# Patient Record
Sex: MALE | Race: ASIAN | NOT HISPANIC OR LATINO
[De-identification: names, ages, dates, MRNs, and addresses within clinical notes are randomized per-mention and may not be internally consistent; named-entity substitution may affect disease eponyms.]

---

## 2017-09-22 ENCOUNTER — APPOINTMENT (OUTPATIENT)
Dept: UROLOGY | Facility: CLINIC | Age: 72
End: 2017-09-22
Payer: MEDICARE

## 2017-09-22 VITALS
HEART RATE: 89 BPM | TEMPERATURE: 98 F | OXYGEN SATURATION: 97 % | BODY MASS INDEX: 22.85 KG/M2 | WEIGHT: 117 LBS | SYSTOLIC BLOOD PRESSURE: 165 MMHG | RESPIRATION RATE: 17 BRPM | DIASTOLIC BLOOD PRESSURE: 87 MMHG

## 2017-09-22 PROCEDURE — 99214 OFFICE O/P EST MOD 30 MIN: CPT

## 2017-09-29 ENCOUNTER — APPOINTMENT (OUTPATIENT)
Age: 72
End: 2017-09-29
Payer: MEDICARE

## 2017-09-29 VITALS
BODY MASS INDEX: 22.85 KG/M2 | SYSTOLIC BLOOD PRESSURE: 153 MMHG | TEMPERATURE: 98 F | HEART RATE: 79 BPM | RESPIRATION RATE: 18 BRPM | DIASTOLIC BLOOD PRESSURE: 78 MMHG | WEIGHT: 117 LBS | OXYGEN SATURATION: 98 %

## 2017-09-29 PROCEDURE — 99215 OFFICE O/P EST HI 40 MIN: CPT

## 2017-09-29 RX ORDER — BACITRACIN ZINC, NEOMYCIN SULFATE, AND POLYMYXIN B SULFATE 400; 3.5; 5 [IU]/G; MG/G; [IU]/G
3.5-400-5 OINTMENT TOPICAL TWICE DAILY
Qty: 1 | Refills: 0 | Status: ACTIVE | COMMUNITY
Start: 2017-09-29 | End: 1900-01-01

## 2017-10-04 LAB
APPEARANCE: CLEAR
BACTERIA UR CULT: ABNORMAL
BACTERIA: ABNORMAL
BILIRUBIN URINE: NEGATIVE
BLOOD URINE: NEGATIVE
COLOR: YELLOW
GLUCOSE QUALITATIVE U: NORMAL MG/DL
KETONES URINE: NEGATIVE
LEUKOCYTE ESTERASE URINE: NEGATIVE
MICROSCOPIC-UA: NORMAL
NITRITE URINE: POSITIVE
PH URINE: 6.5
PROTEIN URINE: NEGATIVE MG/DL
RED BLOOD CELLS URINE: 0 /HPF
SPECIFIC GRAVITY URINE: 1.01
SQUAMOUS EPITHELIAL CELLS: 0 /HPF
UROBILINOGEN URINE: NORMAL MG/DL
WHITE BLOOD CELLS URINE: 4 /HPF

## 2017-10-31 ENCOUNTER — APPOINTMENT (OUTPATIENT)
Dept: UROLOGY | Facility: CLINIC | Age: 72
End: 2017-10-31
Payer: MEDICARE

## 2017-10-31 VITALS
RESPIRATION RATE: 18 BRPM | DIASTOLIC BLOOD PRESSURE: 80 MMHG | TEMPERATURE: 97.9 F | WEIGHT: 118 LBS | OXYGEN SATURATION: 99 % | BODY MASS INDEX: 23.05 KG/M2 | SYSTOLIC BLOOD PRESSURE: 147 MMHG | HEART RATE: 88 BPM

## 2017-10-31 PROCEDURE — 99213 OFFICE O/P EST LOW 20 MIN: CPT

## 2017-11-02 LAB
APPEARANCE: CLEAR
BACTERIA UR CULT: NORMAL
BACTERIA: NEGATIVE
BILIRUBIN URINE: NEGATIVE
BLOOD URINE: NEGATIVE
CALCIUM OXALATE CRYSTALS: ABNORMAL
COLOR: YELLOW
GLUCOSE QUALITATIVE U: NEGATIVE MG/DL
HYALINE CASTS: 0 /LPF
KETONES URINE: NEGATIVE
LEUKOCYTE ESTERASE URINE: NEGATIVE
MICROSCOPIC-UA: NORMAL
NITRITE URINE: NEGATIVE
PH URINE: 6
PROTEIN URINE: NEGATIVE MG/DL
RED BLOOD CELLS URINE: 0 /HPF
SPECIFIC GRAVITY URINE: 1.02
SQUAMOUS EPITHELIAL CELLS: 0 /HPF
UROBILINOGEN URINE: NEGATIVE MG/DL
WHITE BLOOD CELLS URINE: 0 /HPF

## 2018-05-04 ENCOUNTER — APPOINTMENT (OUTPATIENT)
Dept: UROLOGY | Facility: CLINIC | Age: 73
End: 2018-05-04
Payer: MEDICARE

## 2018-05-04 VITALS
DIASTOLIC BLOOD PRESSURE: 76 MMHG | OXYGEN SATURATION: 96 % | SYSTOLIC BLOOD PRESSURE: 147 MMHG | BODY MASS INDEX: 23.24 KG/M2 | WEIGHT: 119 LBS | TEMPERATURE: 98 F | RESPIRATION RATE: 17 BRPM | HEART RATE: 84 BPM

## 2018-05-04 PROCEDURE — 99214 OFFICE O/P EST MOD 30 MIN: CPT

## 2018-05-08 LAB
APPEARANCE: CLEAR
BACTERIA UR CULT: NORMAL
BACTERIA: NEGATIVE
BILIRUBIN URINE: NEGATIVE
BLOOD URINE: NEGATIVE
COLOR: YELLOW
GLUCOSE QUALITATIVE U: NEGATIVE MG/DL
HYALINE CASTS: 1 /LPF
KETONES URINE: NEGATIVE
LEUKOCYTE ESTERASE URINE: NEGATIVE
MICROSCOPIC-UA: NORMAL
NITRITE URINE: NEGATIVE
PH URINE: 6.5
PROTEIN URINE: ABNORMAL MG/DL
RED BLOOD CELLS URINE: 1 /HPF
SPECIFIC GRAVITY URINE: 1.02
SQUAMOUS EPITHELIAL CELLS: 1 /HPF
UROBILINOGEN URINE: NEGATIVE MG/DL
WHITE BLOOD CELLS URINE: 1 /HPF

## 2018-09-28 ENCOUNTER — APPOINTMENT (OUTPATIENT)
Dept: UROLOGY | Facility: CLINIC | Age: 73
End: 2018-09-28
Payer: MEDICARE

## 2018-09-28 VITALS
RESPIRATION RATE: 17 BRPM | BODY MASS INDEX: 22.85 KG/M2 | HEART RATE: 71 BPM | SYSTOLIC BLOOD PRESSURE: 148 MMHG | OXYGEN SATURATION: 98 % | TEMPERATURE: 97.9 F | DIASTOLIC BLOOD PRESSURE: 76 MMHG | WEIGHT: 117 LBS

## 2018-09-28 PROCEDURE — 99214 OFFICE O/P EST MOD 30 MIN: CPT

## 2018-09-29 LAB
APPEARANCE: CLEAR
BACTERIA: NEGATIVE
BILIRUBIN URINE: NEGATIVE
BLOOD URINE: NEGATIVE
COLOR: YELLOW
GLUCOSE QUALITATIVE U: NEGATIVE MG/DL
KETONES URINE: NEGATIVE
LEUKOCYTE ESTERASE URINE: NEGATIVE
MICROSCOPIC-UA: NORMAL
NITRITE URINE: NEGATIVE
PH URINE: 6.5
PROTEIN URINE: NEGATIVE MG/DL
RED BLOOD CELLS URINE: 1 /HPF
SPECIFIC GRAVITY URINE: 1.01
SQUAMOUS EPITHELIAL CELLS: 0 /HPF
UROBILINOGEN URINE: NEGATIVE MG/DL
WHITE BLOOD CELLS URINE: 0 /HPF

## 2018-11-13 ENCOUNTER — APPOINTMENT (OUTPATIENT)
Dept: UROLOGY | Facility: CLINIC | Age: 73
End: 2018-11-13
Payer: MEDICARE

## 2018-11-13 VITALS
HEART RATE: 93 BPM | OXYGEN SATURATION: 96 % | DIASTOLIC BLOOD PRESSURE: 79 MMHG | TEMPERATURE: 97.6 F | BODY MASS INDEX: 23.83 KG/M2 | SYSTOLIC BLOOD PRESSURE: 155 MMHG | RESPIRATION RATE: 18 BRPM | WEIGHT: 122 LBS

## 2018-11-13 DIAGNOSIS — N13.8 BENIGN PROSTATIC HYPERPLASIA WITH LOWER URINARY TRACT SYMPMS: ICD-10-CM

## 2018-11-13 DIAGNOSIS — S81.802A UNSPECIFIED OPEN WOUND, LEFT LOWER LEG, INITIAL ENCOUNTER: ICD-10-CM

## 2018-11-13 DIAGNOSIS — N40.1 BENIGN PROSTATIC HYPERPLASIA WITH LOWER URINARY TRACT SYMPMS: ICD-10-CM

## 2018-11-13 PROCEDURE — 99214 OFFICE O/P EST MOD 30 MIN: CPT | Mod: 25

## 2018-11-13 PROCEDURE — 76775 US EXAM ABDO BACK WALL LIM: CPT

## 2018-11-19 LAB
APPEARANCE: CLEAR
BACTERIA UR CULT: NORMAL
BACTERIA: NEGATIVE
BILIRUBIN URINE: NEGATIVE
BLOOD URINE: ABNORMAL
COLOR: YELLOW
GLUCOSE QUALITATIVE U: NEGATIVE MG/DL
HYALINE CASTS: 0 /LPF
KETONES URINE: NEGATIVE
LEUKOCYTE ESTERASE URINE: NEGATIVE
MICROSCOPIC-UA: NORMAL
NITRITE URINE: NEGATIVE
PH URINE: 6
PROTEIN URINE: NEGATIVE MG/DL
RED BLOOD CELLS URINE: 7 /HPF
SPECIFIC GRAVITY URINE: 1.01
SQUAMOUS EPITHELIAL CELLS: 0 /HPF
UROBILINOGEN URINE: NEGATIVE MG/DL
WHITE BLOOD CELLS URINE: 1 /HPF

## 2018-12-11 ENCOUNTER — APPOINTMENT (OUTPATIENT)
Dept: UROLOGY | Facility: CLINIC | Age: 73
End: 2018-12-11
Payer: MEDICARE

## 2018-12-11 VITALS
HEART RATE: 95 BPM | SYSTOLIC BLOOD PRESSURE: 139 MMHG | RESPIRATION RATE: 18 BRPM | TEMPERATURE: 97.8 F | BODY MASS INDEX: 23.83 KG/M2 | OXYGEN SATURATION: 95 % | DIASTOLIC BLOOD PRESSURE: 67 MMHG | WEIGHT: 122 LBS

## 2018-12-11 LAB
APPEARANCE: CLEAR
BACTERIA: NEGATIVE
BILIRUBIN URINE: NEGATIVE
BLOOD URINE: NEGATIVE
COLOR: YELLOW
GLUCOSE QUALITATIVE U: NEGATIVE MG/DL
KETONES URINE: NEGATIVE
LEUKOCYTE ESTERASE URINE: NEGATIVE
MICROSCOPIC-UA: NORMAL
NITRITE URINE: NEGATIVE
PH URINE: 6.5
PROTEIN URINE: NEGATIVE MG/DL
RED BLOOD CELLS URINE: 0 /HPF
SPECIFIC GRAVITY URINE: 1.01
SQUAMOUS EPITHELIAL CELLS: 0 /HPF
UROBILINOGEN URINE: NEGATIVE MG/DL
WHITE BLOOD CELLS URINE: 0 /HPF

## 2018-12-11 PROCEDURE — 99213 OFFICE O/P EST LOW 20 MIN: CPT

## 2018-12-13 LAB — BACTERIA UR CULT: NORMAL

## 2019-03-12 ENCOUNTER — APPOINTMENT (OUTPATIENT)
Dept: UROLOGY | Facility: CLINIC | Age: 74
End: 2019-03-12
Payer: MEDICARE

## 2019-03-12 PROCEDURE — 99214 OFFICE O/P EST MOD 30 MIN: CPT | Mod: 25

## 2019-03-12 PROCEDURE — 76775 US EXAM ABDO BACK WALL LIM: CPT | Mod: 26

## 2019-03-12 NOTE — LETTER BODY
[FreeTextEntry1] : Musa Taveras MD\par [address] [facility]\[place] [address]\par [phone]\par [phone]\par \par Dear Dr. Taveras,\par \par Reason for visit: BPH. Left ureteral stone.\par \par This is a 73 year-old gentleman with chronic BPH and bacterial UTI. Patient returns today for renal ultrasound. Since his last visit, patient is doing well. He was previously emitted to [facility] for hematuria 1 month ago. Patient denies any flank pain. His recent renal ultrasound shows negative for hydronephrosis. The patient previously went to local ER for flank pain. CT scan demonstrating left UVJ stone. He was given Flomax. He denies any fevers of chills. He reports mild discomfort. He continues to take Proscar and Flomax twice a day regularly without any side effects or difficulties with the medication. Patient reports stable urinary symptoms with medication. Patient denies any urinary retention, dysuria, or changes in health. Patient has no pain. The past medical history and family history and social history are unchanged. All other review of systems are negative. Patient denies any changes in medications. Medication list was reconciled.\par \par On examination, the patient is an older-appearing gentleman in no acute distress. He is alert and oriented follows commands. He has normal mood and affect. He is normocephalic. Oral no thrush. Neck is supple. Respirations are unlabored. His abdomen is soft and nontender. Liver is nonpalpable. Bladder is nonpalpable. No CVA tenderness. Neurologically he is grossly intact. No peripheral edema. Skin without gross abnormality.\par \par I personally reviewed renal ultrasound with the patient today. Images demonstrated no evidence of stones, solid masses or hydronephrosis. \par \par Assessment: BPH, symptoms stable with Proscar and Flomax BID. Bacterial UTI. Left ureteral stone. \par \par I counseled the patient. In terms of his BPH, I encouraged patient to continue Flomax BID and Proscar. I renewed his prescription for his medication today. In terms of his left ureteral stone, ultrasound today was unremarkable. Patient is asymptomatic. The patient will BMP today. Risks and alternatives were discussed. I answered the patient questions. The patient will follow-up as directed and will contact me with any questions or concerns. Thank you for the opportunity to participate in the care of Mr. GUERRA. I will keep you updated on his progress.\par \par Plan: Continue Proscar and Flomax BID. BMP. Follow up in 3 months. \par

## 2019-03-12 NOTE — ADDENDUM
[FreeTextEntry1] : Entered by Kiah Art, acting as scribe for Dr. Tony Villalta.\par \par The documentation recorded by the scribe accurately reflects the service I personally performed and the decisions made by me.

## 2019-03-14 LAB
ANION GAP SERPL CALC-SCNC: 12 MMOL/L
BUN SERPL-MCNC: 15 MG/DL
CALCIUM SERPL-MCNC: 9 MG/DL
CHLORIDE SERPL-SCNC: 101 MMOL/L
CO2 SERPL-SCNC: 27 MMOL/L
CREAT SERPL-MCNC: 0.89 MG/DL
GLUCOSE SERPL-MCNC: 91 MG/DL
POTASSIUM SERPL-SCNC: 3.9 MMOL/L
SODIUM SERPL-SCNC: 140 MMOL/L

## 2019-05-10 ENCOUNTER — APPOINTMENT (OUTPATIENT)
Dept: UROLOGY | Facility: CLINIC | Age: 74
End: 2019-05-10

## 2019-06-18 ENCOUNTER — APPOINTMENT (OUTPATIENT)
Dept: UROLOGY | Facility: CLINIC | Age: 74
End: 2019-06-18
Payer: MEDICARE

## 2019-06-18 PROCEDURE — 99214 OFFICE O/P EST MOD 30 MIN: CPT | Mod: 25

## 2019-06-18 PROCEDURE — 76775 US EXAM ABDO BACK WALL LIM: CPT

## 2019-09-27 ENCOUNTER — APPOINTMENT (OUTPATIENT)
Dept: UROLOGY | Facility: CLINIC | Age: 74
End: 2019-09-27

## 2019-12-17 ENCOUNTER — APPOINTMENT (OUTPATIENT)
Dept: UROLOGY | Facility: CLINIC | Age: 74
End: 2019-12-17
Payer: MEDICARE

## 2019-12-17 PROCEDURE — 76775 US EXAM ABDO BACK WALL LIM: CPT

## 2019-12-17 PROCEDURE — 99214 OFFICE O/P EST MOD 30 MIN: CPT | Mod: 25

## 2019-12-17 NOTE — ADDENDUM
[FreeTextEntry1] : Entered by Jens Jimenez, acting as scribe for Dr. Tony Villalta.\par \par The documentation recorded by the scribe accurately reflects the service I personally performed and the decisions made by me.

## 2019-12-17 NOTE — LETTER BODY
[FreeTextEntry1] : Musa Taveras MD\par [address] [facility]\[place] [address]\par [phone] [phone]\par \par Dear Dr. Taveras,\par \par Reason for visit: BPH. Left ureteral stone.\par \par This is a 74 year-old Cantonese-speaking gentleman with chronic BPH and bacterial UTI. He was previously emitted to [facility] for hematuria in May 2019. The patient previously went to local ER for flank pain and obtained a CT scan demonstrating left UVJ stone. He was given Flomax. Patient returns today for renal ultrasound. Since his last visit, patient denies any flank pain. He denies passing any stones. He denies any fevers of chills. He continues to take Proscar and Flomax twice a day regularly without any side effects or difficulties with the medication. Patient reports stable urinary symptoms with medication. Patient denies any urinary retention, dysuria, or changes in health. Patient has no pain currently. The past medical history and family history and social history are unchanged. All other review of systems are negative. Patient denies any changes in medications. Medication list was reconciled.\par \par On examination, the patient is an older-appearing gentleman in no acute distress. He is alert and oriented follows commands. He has normal mood and affect. He is normocephalic. Oral no thrush. Neck is supple. Respirations are unlabored. His abdomen is soft and nontender. Liver is nonpalpable. Bladder is nonpalpable. No CVA tenderness. Neurologically he is grossly intact. No peripheral edema. Skin without gross abnormality.\par \par I personally reviewed renal ultrasound with the patient today. Images demonstrated no evidence of stones, solid masses or hydronephrosis. \par \par Assessment: BPH, symptoms stable with Proscar and Flomax BID. Bacterial UTI. Left ureteral stone, resolved.\par \par I counseled the patient. In terms of his BPH, his symptoms are stable with medication. I renewed the patient's prescription for Flomax BID and Proscar today. I encouraged the patient to continue medications regularly as directed. His renal ultrasound today demonstrated no evidence of stones or hydronephrosis. However, he denies passing any stones. I recommend the patient obtain CT abdomen and pelvis to confirm the results of his renal ultrasound. Risks and alternatives were discussed. I answered the patient questions. The patient will follow-up as directed and will contact me with any questions or concerns. Thank you for the opportunity to participate in the care of Mr. GUERRA. I will keep you updated on his progress.\par \par Plan: Continue Proscar and Flomax BID. CT abdomen and pelvis. Follow up as directed.

## 2020-01-21 ENCOUNTER — APPOINTMENT (OUTPATIENT)
Dept: UROLOGY | Facility: CLINIC | Age: 75
End: 2020-01-21
Payer: MEDICARE

## 2020-01-21 VITALS
RESPIRATION RATE: 18 BRPM | TEMPERATURE: 98.1 F | HEART RATE: 93 BPM | OXYGEN SATURATION: 96 % | DIASTOLIC BLOOD PRESSURE: 79 MMHG | WEIGHT: 124 LBS | BODY MASS INDEX: 24.22 KG/M2 | SYSTOLIC BLOOD PRESSURE: 162 MMHG

## 2020-01-21 PROCEDURE — 99214 OFFICE O/P EST MOD 30 MIN: CPT

## 2020-01-21 NOTE — LETTER BODY
[FreeTextEntry1] : Musa Taveras MD\par [address] [facility]\[place] [address]\par [phone] [phone]\par \par Dear Dr. Taveras,\par \par Reason for visit: BPH. Left ureteral stone.\par \par This is a 74 year-old Cantonese-speaking gentleman with chronic BPH and left ureteral stone. He was previously emitted to [facility] for hematuria in May 2019. The patient previously went to local ER for flank pain and obtained a CT scan demonstrating left UVJ stone. Patient returns today for follow-up. Since his last visit, patient obtained CT scan in Jan 2020 demonstrating enlargement in his left ureteral stones, now measuring up to 0.8 cm. He denies any flank pain or dysuria. He denies any interval changes in health. He continues to take Flomax and Proscar as directed. Patient has no pain currently. The past medical history and family history and social history are unchanged. All other review of systems are negative. Patient denies any changes in medications. Medication list was reconciled.\par \par On examination, the patient is an older-appearing gentleman in no acute distress. He is alert and oriented follows commands. He has normal mood and affect. He is normocephalic. Oral no thrush. Neck is supple. Respirations are unlabored. His abdomen is soft and nontender. Liver is nonpalpable. Bladder is nonpalpable. No CVA tenderness. Neurologically he is grossly intact. No peripheral edema. Skin without gross abnormality.\par \par I personally reviewed CT images with the patient today. Images demonstrated left ureteral stones measuring up to 0.8 cm, enlarging in the past year.\par \par Assessment: BPH, symptoms stable with Proscar and Flomax BID. Bacterial UTI, resolved. Left ureteral stone.\par \par I counseled the patient. Patient's CT scan demonstrates enlargement in his left ureteral stone, now measuring 0.8 cm. Patient understands that his ureteral stone has enlarged within the past year. Given the size and location of the stone, he is unlikely to pass the stone. The risk for chronic renal obstruction was discussed. I advised the patient to consider left ureteroscopy, laser lithotripsy, stone extraction, and stent placement. I counseled the patient on the procedure. Risks and alternatives were discussed. I answered the patient questions. Patient would like some time to consider his options. The patient will follow-up as directed and will contact me with any questions or concerns. Thank you for the opportunity to participate in the care of Mr. GUERRA. I will keep you updated on his progress.\par \par Plan: Consider left ureteroscopy, laser lithotripsy, stone extraction, and stent placement. Follow up as directed.\par

## 2020-01-21 NOTE — ADDENDUM
[FreeTextEntry1] : Entered by Godfrey Vargas, acting as scribe for Dr. Tony Villalta.\par \par The documentation recorded by the scribe accurately reflects the service I personally performed and the decisions made by me.

## 2020-03-10 ENCOUNTER — APPOINTMENT (OUTPATIENT)
Dept: UROLOGY | Facility: CLINIC | Age: 75
End: 2020-03-10
Payer: MEDICARE

## 2020-03-10 VITALS
TEMPERATURE: 97.9 F | BODY MASS INDEX: 24.41 KG/M2 | WEIGHT: 125 LBS | OXYGEN SATURATION: 98 % | DIASTOLIC BLOOD PRESSURE: 78 MMHG | SYSTOLIC BLOOD PRESSURE: 159 MMHG | RESPIRATION RATE: 18 BRPM | HEART RATE: 106 BPM

## 2020-03-10 PROCEDURE — 76775 US EXAM ABDO BACK WALL LIM: CPT

## 2020-03-10 PROCEDURE — 99213 OFFICE O/P EST LOW 20 MIN: CPT | Mod: 25

## 2020-03-10 PROCEDURE — 51798 US URINE CAPACITY MEASURE: CPT

## 2020-03-10 RX ORDER — CIPROFLOXACIN HYDROCHLORIDE 500 MG/1
500 TABLET, FILM COATED ORAL
Refills: 0 | Status: COMPLETED | OUTPATIENT
Start: 2020-03-10

## 2020-03-10 RX ADMIN — CIPROFLOXACIN HYDROCHLORIDE 0 MG: 500 TABLET, FILM COATED ORAL at 00:00

## 2020-03-11 LAB
ANION GAP SERPL CALC-SCNC: 15 MMOL/L
APTT BLD: 31.7 SEC
BASOPHILS # BLD AUTO: 0.04 K/UL
BASOPHILS NFR BLD AUTO: 0.4 %
BUN SERPL-MCNC: 12 MG/DL
CALCIUM SERPL-MCNC: 9.3 MG/DL
CHLORIDE SERPL-SCNC: 104 MMOL/L
CO2 SERPL-SCNC: 23 MMOL/L
CREAT SERPL-MCNC: 0.86 MG/DL
EOSINOPHIL # BLD AUTO: 0.11 K/UL
EOSINOPHIL NFR BLD AUTO: 1.2 %
GLUCOSE SERPL-MCNC: 100 MG/DL
HCT VFR BLD CALC: 44.7 %
HGB BLD-MCNC: 13.2 G/DL
IMM GRANULOCYTES NFR BLD AUTO: 0.2 %
INR PPP: 1.09 RATIO
LYMPHOCYTES # BLD AUTO: 1.93 K/UL
LYMPHOCYTES NFR BLD AUTO: 20.4 %
MAN DIFF?: NORMAL
MCHC RBC-ENTMCNC: 21.2 PG
MCHC RBC-ENTMCNC: 29.5 GM/DL
MCV RBC AUTO: 71.9 FL
MONOCYTES # BLD AUTO: 0.72 K/UL
MONOCYTES NFR BLD AUTO: 7.6 %
NEUTROPHILS # BLD AUTO: 6.66 K/UL
NEUTROPHILS NFR BLD AUTO: 70.2 %
PLATELET # BLD AUTO: 232 K/UL
POTASSIUM SERPL-SCNC: 4.1 MMOL/L
PT BLD: 12.6 SEC
RBC # BLD: 6.22 M/UL
RBC # FLD: 17.9 %
SODIUM SERPL-SCNC: 141 MMOL/L
WBC # FLD AUTO: 9.48 K/UL

## 2020-03-12 NOTE — LETTER BODY
[FreeTextEntry1] : Musa Taveras MD\par [address] [facility]\[place] [address]\par [phone]\par [phone]\par \par Dear Dr. Taveras,\par \par Reason for visit: BPH. Left ureteral stone.\par \par This is a 74 year-old Cantonese-speaking gentleman with chronic BPH and left ureteral stone. The patient previously went to local ER for flank pain and obtained a CT scan demonstrating left UVJ stone. He obtained a CT scan in Jan 2020 demonstrating enlargement in his ureteral stones measuring 0.8 cm. Patient returns today for voiding trial. Since his last visit, he denies any interval changes in health. He continues to take Flomax and Proscar as directed. All other review of systems were negative. The past medical history and family history and social history are unchanged. All other review of systems are negative. Patient denies any changes in medications. Medication list was reconciled.\par \par On examination, the patient is an older-appearing gentleman in no acute distress. He is alert and oriented follows commands. He has normal mood and affect. He is normocephalic. Oral no thrush. Neck is supple. Respirations are unlabored. His abdomen is soft and nontender. Liver is nonpalpable. Bladder is nonpalpable. No CVA tenderness. Neurologically he is grossly intact. No peripheral edema. Skin without gross abnormality.\par \par I personally reviewed ultrasound images with the patient today. Images demonstrated a 1.1 cm nonvascular cyst with a thin septation visualized in the right lower pole.  Both kidneys appear normal in size and echogenicity. No stones, solid masses or hydronephrosis visualized.\par \par Patient was given a voiding trial today. Patient was able to void spontaneously.\par \par Post-void residual on bladder scan today was 147 cc.\par \par Assessment: BPH, symptoms stable with Proscar and Flomax BID. Bacterial UTI, resolved. Left ureteral stone s/p ureteroscopy.\par \par I counseled the patient. Patient's ultrasound today indicates resolution of his left ureteral stone. He passed his voiding trial today. Patient was taught to CIC with 16 straight Ukrainian García catheter. I counseled the patient regarding the procedure. I recommend he follow up on Friday for re-evaluation. The risks and benefits were discussed. Alternatives were given. I answered the patient questions. The patient will follow-up as directed and will contact me with any questions or concerns. Thank you for the opportunity to participate in the care of Mr. GUERRA. I will keep you updated on his progress.\par \par Plan: CIC. Follow up on Friday.

## 2020-03-13 ENCOUNTER — APPOINTMENT (OUTPATIENT)
Dept: UROLOGY | Facility: CLINIC | Age: 75
End: 2020-03-13
Payer: MEDICARE

## 2020-03-13 VITALS
HEART RATE: 96 BPM | WEIGHT: 125 LBS | OXYGEN SATURATION: 96 % | DIASTOLIC BLOOD PRESSURE: 80 MMHG | RESPIRATION RATE: 18 BRPM | BODY MASS INDEX: 24.41 KG/M2 | SYSTOLIC BLOOD PRESSURE: 155 MMHG | TEMPERATURE: 98.2 F

## 2020-03-13 PROCEDURE — 51798 US URINE CAPACITY MEASURE: CPT

## 2020-03-13 PROCEDURE — 99214 OFFICE O/P EST MOD 30 MIN: CPT | Mod: 25

## 2020-03-13 NOTE — LETTER BODY
[FreeTextEntry1] : Musa Taveras MD\par [address] [facility]\[place] [address]\par [phone]\par [phone]\par \par Dear Dr. Taveras,\par \par Reason for visit: BPH. Left ureteral stone. Urinary retention\par \par This is a 74 year-old Cantonese-speaking gentleman with chronic BPH, left ureteral stone, and urinary retention. The patient previously went to local ER for flank pain and obtained a CT scan demonstrating left UVJ stone. He obtained a CT scan in Jan 2020 demonstrating enlargement in his ureteral stones measuring 0.8 cm. His ultrasound last visit demonstrated resolution of his ureteral stone. Patient returns today for follow-up. Since his last visit, patient denies CIC and is voiding without difficulty. He continues to take Flomax and Proscar as directed. All other review of systems were negative. The past medical history and family history and social history are unchanged. All other review of systems are negative. Patient denies any changes in medications. Medication list was reconciled.\par \par On examination, the patient is an older-appearing gentleman in no acute distress. He is alert and oriented follows commands. He has normal mood and affect. He is normocephalic. Oral no thrush. Neck is supple. Respirations are unlabored. His abdomen is soft and nontender. Liver is nonpalpable. Bladder is nonpalpable. No CVA tenderness. Neurologically he is grossly intact. No peripheral edema. Skin without gross abnormality.\par \par Post-void residual on bladder scan today was 26 cc.\par \par Assessment: BPH, symptoms stable with Proscar and Flomax BID. Bacterial UTI, resolved. Left ureteral stone s/p ureteroscopy. Urinary retention, resolved.\par \par I counseled the patient. He is voiding well and without difficulty, PVR today was 26 cc. I renewed the patient's prescription for Flomax and Proscar today. I encouraged the patient to continue medications regularly as directed.  The risks and benefits were discussed. Alternatives were given. I answered the patient questions. The patient will follow-up as directed and will contact me with any questions or concerns. Thank you for the opportunity to participate in the care of Mr. GUERRA. I will keep you updated on his progress.\par \par Plan: Continue Flomax BID and Proscar.  Follow up in 6 months.

## 2020-09-11 ENCOUNTER — APPOINTMENT (OUTPATIENT)
Dept: UROLOGY | Facility: CLINIC | Age: 75
End: 2020-09-11
Payer: MEDICARE

## 2020-09-11 VITALS
SYSTOLIC BLOOD PRESSURE: 149 MMHG | HEART RATE: 88 BPM | TEMPERATURE: 97.3 F | DIASTOLIC BLOOD PRESSURE: 80 MMHG | BODY MASS INDEX: 23.24 KG/M2 | RESPIRATION RATE: 18 BRPM | OXYGEN SATURATION: 97 % | WEIGHT: 119 LBS

## 2020-09-11 PROCEDURE — 99214 OFFICE O/P EST MOD 30 MIN: CPT | Mod: 25

## 2020-09-11 PROCEDURE — 76775 US EXAM ABDO BACK WALL LIM: CPT

## 2020-09-11 NOTE — LETTER BODY
[FreeTextEntry1] : Musa Taveras MD\par [address] [facility]\[place] [address]\par [phone]\par [phone]\par \par Dear Dr. Taveras,\par \par Reason for visit: BPH. Left ureteral stone. Urinary retention\par \par This is a 74 year-old Cantonese-speaking gentleman with chronic BPH, left ureteral stone, and urinary retention. The patient previously went to local ER in March 2020 for flank pain and obtained a CT scan demonstrating left UVJ stone. He obtained a CT scan in Jan 2020 demonstrating enlargement in his ureteral stones measuring 0.8 cm. His ultrasound last visit demonstrated resolution of his ureteral stone. Patient returns today for follow-up. Since his last visit, patient is doing overall well.  He reports taking Flomax BID and Proscar without difficulties. The past medical history and family history and social history are unchanged. All other review of systems are negative. Patient denies any changes in medications. Medication list was reconciled.\par \par On examination, the patient is an older-appearing gentleman in no acute distress. He is alert and oriented follows commands. He has normal mood and affect. He is normocephalic. Oral no thrush. Neck is supple. Respirations are unlabored. His abdomen is soft and nontender. Liver is nonpalpable. Bladder is nonpalpable. No CVA tenderness. Neurologically he is grossly intact. No peripheral edema. Skin without gross abnormality.\par \par Assessment: BPH, symptoms stable with Proscar and Flomax BID. Bacterial UTI, resolved. Left ureteral stone s/p ureteroscopy. Urinary retention, resolved.\par \par I counseled the patient. In terms of his BPH, I recommend the patient continue taking Flomax BID and Proscar. I renewed the patient's prescription for Proscar today. I encouraged the patient to continue medications regularly as directed. I recommend the patient obtain BMP and PSA today to ensure stability. In terms of his previous ureteral stone, I recommend he obtain a renal ultrasound today. The risks and benefits were discussed. Alternatives were given. I answered the patient questions. The patient will follow-up as directed and will contact me with any questions or concerns. Thank you for the opportunity to participate in the care of Mr. GUERRA. I will keep you updated on his progress.\par \par Plan: BMP. PSA. Continue Flomax BID and Proscar. Renal ultrasound. Follow up in 1 year.\par \par Ultrasound images today again demonstrated a 1.0 cm small nonvascular cyst visualized in the right lower pole. Both kidneys are normal in size and echogenicity without hydronephrosis, stones or solid masses present.

## 2020-09-17 LAB
ANION GAP SERPL CALC-SCNC: 14 MMOL/L
BUN SERPL-MCNC: 14 MG/DL
CALCIUM SERPL-MCNC: 9.5 MG/DL
CHLORIDE SERPL-SCNC: 102 MMOL/L
CO2 SERPL-SCNC: 24 MMOL/L
CREAT SERPL-MCNC: 0.99 MG/DL
GLUCOSE SERPL-MCNC: 91 MG/DL
POTASSIUM SERPL-SCNC: 4.3 MMOL/L
PSA FREE FLD-MCNC: 25 %
PSA FREE SERPL-MCNC: 1.13 NG/ML
PSA SERPL-MCNC: 4.6 NG/ML
SODIUM SERPL-SCNC: 140 MMOL/L

## 2021-08-23 NOTE — ADDENDUM
Addended by: WENDI RON on: 8/23/2021 09:45 AM     Modules accepted: Orders     [FreeTextEntry1] : Entered by Praveen Reddy, acting as scribe for Dr. Tony Villalta.\par \par The documentation recorded by the scribe accurately reflects the service I personally performed and the decisions made by me.\par

## 2021-09-17 ENCOUNTER — APPOINTMENT (OUTPATIENT)
Dept: UROLOGY | Facility: CLINIC | Age: 76
End: 2021-09-17
Payer: MEDICARE

## 2021-09-17 VITALS — TEMPERATURE: 97.9 F

## 2021-09-17 PROCEDURE — 76775 US EXAM ABDO BACK WALL LIM: CPT

## 2021-09-17 PROCEDURE — 99214 OFFICE O/P EST MOD 30 MIN: CPT

## 2021-09-17 NOTE — LETTER BODY
[FreeTextEntry1] : Musa Taveras MD\par [address] [facility]\[place] [address]\par [phone]\par [phone]\par \par Dear Dr. Taveras,\par \par Reason for visit: BPH. Left ureteral stone. Urinary retention\par \par This is a 76 year-old Cantonese-speaking gentleman with chronic BPH, left ureteral stone, and urinary retention. The patient previously went to local ER in March 2020 for flank pain and obtained a CT scan demonstrating left UVJ stone. He obtained a CT scan in Jan 2020 demonstrating enlargement in his ureteral stones measuring 0.8 cm. His ultrasound in September 2020 demonstrated resolution of his ureteral stone. Patient returns today for follow-up and renal ultrasound . Since his last visit, patient is doing overall well. He reports taking Flomax BID and Proscar without difficulties. He notes stable urinary symptoms. The past medical history and family history and social history are unchanged. All other review of systems are negative. Patient denies any changes in medications. Medication list was reconciled.\par \par On examination, the patient is an older-appearing gentleman in no acute distress. He is alert and oriented follows commands. He has normal mood and affect. He is normocephalic. Oral no thrush. Neck is supple. Respirations are unlabored. His abdomen is soft and nontender. Liver is nonpalpable. Bladder is nonpalpable. No CVA tenderness. Neurologically he is grossly intact. No peripheral edema. Skin without gross abnormality.\par \par His BMP demonstrated normal renal functions, creatinine 0.99. His PSA was 4.60, which is elevated. \par . \par I personally reviewed ultrasound images with the patient today and images demonstrated again a 1.1 cm nonvascular cyst in the right lower pole. Both kidneys appear normal in size and echogenicity. No stones, solid masses or hydronephrosis visualized.\par \par Assessment: BPH, symptoms stable with Proscar and Flomax BID. Left ureteral stone s/p ureteroscopy. Urinary retention, resolved. \par \par I counseled the patient.  In terms of his previous ureteral stone, his renal ultrasound today was negative for any stones or hydronephrosis. I reassured the patient.  In terms of his BPH, I recommend the patient continue taking Flomax BID and Proscar. I renewed the patient's prescription for Proscar today. I encouraged the patient to continue medications regularly as directed. I recommend the patient repeat BMP and PSA today to ensure stability. The risks and benefits were discussed. Alternatives were given. I answered the patient questions. The patient will follow-up as directed and will contact me with any questions or concerns. Thank you for the opportunity to participate in the care of Mr. GUERRA. I will keep you updated on his progress.\par \par Plan: BMP. PSA. Continue Flomax BID and Proscar. Follow up in 1 year.

## 2021-09-17 NOTE — HISTORY OF PRESENT ILLNESS
[FreeTextEntry1] : Please refer to URO Consult note \par \par fu BPH \par previous stones \par US neg come back in 1 yr

## 2021-09-17 NOTE — ADDENDUM
[FreeTextEntry1] : Entered by Angel Luis Rodrigues, acting as scribe for Dr. Tony Villalta.\par \par The documentation recorded by the scribe accurately reflects the service I personally performed and the decisions made by me.

## 2021-09-19 LAB
ANION GAP SERPL CALC-SCNC: 17 MMOL/L
BUN SERPL-MCNC: 23 MG/DL
CALCIUM SERPL-MCNC: 9.1 MG/DL
CHLORIDE SERPL-SCNC: 102 MMOL/L
CO2 SERPL-SCNC: 23 MMOL/L
CREAT SERPL-MCNC: 0.91 MG/DL
GLUCOSE SERPL-MCNC: 99 MG/DL
POTASSIUM SERPL-SCNC: 4.3 MMOL/L
PSA FREE FLD-MCNC: 26 %
PSA FREE SERPL-MCNC: 0.81 NG/ML
PSA SERPL-MCNC: 3.14 NG/ML
SODIUM SERPL-SCNC: 142 MMOL/L

## 2021-09-24 ENCOUNTER — NON-APPOINTMENT (OUTPATIENT)
Age: 76
End: 2021-09-24

## 2022-11-01 ENCOUNTER — APPOINTMENT (OUTPATIENT)
Dept: UROLOGY | Facility: CLINIC | Age: 77
End: 2022-11-01

## 2022-11-01 VITALS — TEMPERATURE: 97.8 F

## 2022-11-01 DIAGNOSIS — R33.9 RETENTION OF URINE, UNSPECIFIED: ICD-10-CM

## 2022-11-01 DIAGNOSIS — Z00.00 ENCOUNTER FOR GENERAL ADULT MEDICAL EXAMINATION W/OUT ABNORMAL FINDINGS: ICD-10-CM

## 2022-11-01 PROCEDURE — 99214 OFFICE O/P EST MOD 30 MIN: CPT

## 2022-11-01 PROCEDURE — 76775 US EXAM ABDO BACK WALL LIM: CPT

## 2022-11-01 NOTE — ADDENDUM
[FreeTextEntry1] : Entered by GEMA GRIFFIN, acting as scribe for Dr. Tony Villalta.\par The documentation recorded by the scribe accurately reflects the service I personally performed and the decisions made by me. 
yes

## 2022-11-01 NOTE — LETTER BODY
[FreeTextEntry1] : Musa Taveras MD\par [address] [facility]\[place] [address]\par [phone]\par [phone]\par \par Dear Dr. Taveras,\par \par Reason for visit: BPH. Left ureteral stone. Urinary retention\par \par This is a 77 year-old Cantonese-speaking gentleman with chronic BPH, left ureteral stone, and urinary retention. The patient previously went to local ER in March 2020 for flank pain and obtained a CT scan demonstrating left UVJ stone. He obtained a CT scan in Jan 2020 demonstrating enlargement in his ureteral stones measuring 0.8 cm. His ultrasound in September 2020 demonstrated resolution of his ureteral stone. His ultrasound in September 2021 demonstrated a 1.1 cm nonvascular cyst in the right kidney, no stones or hydronephrosis. Patient returns today for follow-up and renal ultrasound. Since his last visit, patient is doing overall well. He reports taking Flomax BID and Proscar without difficulties. He notes stable urinary symptoms. The past medical history and family history and social history are unchanged. All other review of systems are negative. Patient denies any changes in medications. Medication list was reconciled.\par \par On examination, the patient is an older-appearing gentleman in no acute distress. He is alert and oriented follows commands. He has normal mood and affect. He is normocephalic. Oral no thrush. Neck is supple. Respirations are unlabored. His abdomen is soft and nontender. Liver is nonpalpable. Bladder is nonpalpable. No CVA tenderness. Neurologically he is grossly intact. No peripheral edema. Skin without gross abnormality.\par \par His BMP in September 2021 demonstrated normal renal functions, creatinine 0.91. His PSA was 3.14, which is improved. His previous PSA was 4.60. \par . \par I personally reviewed ultrasound images with the patient today and images demonstrated again a 1.2 cm nonvascular cyst in the right lower pole. Both kidneys appear normal in size and echogenicity. No stones, solid masses or hydronephrosis visualized.\par \par Assessment: BPH, symptoms stable with Proscar and Flomax BID. Left ureteral stone s/p ureteroscopy. Urinary retention, resolved. \par \par I counseled the patient. In terms of his previous ureteral stone, his renal ultrasound today was negative for any stones or hydronephrosis. I reassured the patient. In terms of his BPH, I recommend the patient continue taking Flomax BID and Proscar. I renewed the patient's prescription for Flomax and Proscar today. I encouraged the patient to continue medications regularly as directed. I recommend the patient repeat BMP and PSA today to ensure stability. The risks and benefits were discussed. Alternatives were given. I answered the patient questions. The patient will follow-up as directed and will contact me with any questions or concerns. Thank you for the opportunity to participate in the care of Mr. GUERRA. I will keep you updated on his progress.\par \par Plan: BMP. PSA. Continue Flomax BID and Proscar. Follow up in 1 year.

## 2022-11-02 LAB
ANION GAP SERPL CALC-SCNC: 13 MMOL/L
BUN SERPL-MCNC: 16 MG/DL
CALCIUM SERPL-MCNC: 9.7 MG/DL
CHLORIDE SERPL-SCNC: 102 MMOL/L
CO2 SERPL-SCNC: 25 MMOL/L
CREAT SERPL-MCNC: 0.97 MG/DL
EGFR: 80 ML/MIN/1.73M2
GLUCOSE SERPL-MCNC: 86 MG/DL
POTASSIUM SERPL-SCNC: 4.2 MMOL/L
PSA FREE FLD-MCNC: 34 %
PSA FREE SERPL-MCNC: 1.11 NG/ML
PSA SERPL-MCNC: 3.22 NG/ML
SODIUM SERPL-SCNC: 140 MMOL/L

## 2023-02-21 ENCOUNTER — RX RENEWAL (OUTPATIENT)
Age: 78
End: 2023-02-21

## 2023-03-11 ENCOUNTER — RX RENEWAL (OUTPATIENT)
Age: 78
End: 2023-03-11

## 2023-10-31 ENCOUNTER — APPOINTMENT (OUTPATIENT)
Dept: UROLOGY | Facility: CLINIC | Age: 78
End: 2023-10-31
Payer: MEDICARE

## 2023-10-31 VITALS
TEMPERATURE: 97.9 F | OXYGEN SATURATION: 94 % | HEART RATE: 95 BPM | SYSTOLIC BLOOD PRESSURE: 158 MMHG | BODY MASS INDEX: 23.77 KG/M2 | DIASTOLIC BLOOD PRESSURE: 91 MMHG | WEIGHT: 121.7 LBS | RESPIRATION RATE: 19 BRPM

## 2023-10-31 DIAGNOSIS — R97.20 ELEVATED PROSTATE, SPECIFIC ANTIGEN [PSA]: ICD-10-CM

## 2023-10-31 DIAGNOSIS — N20.1 CALCULUS OF URETER: ICD-10-CM

## 2023-10-31 LAB
ANION GAP SERPL CALC-SCNC: 12 MMOL/L
BUN SERPL-MCNC: 14 MG/DL
CALCIUM SERPL-MCNC: 9.7 MG/DL
CHLORIDE SERPL-SCNC: 103 MMOL/L
CO2 SERPL-SCNC: 26 MMOL/L
CREAT SERPL-MCNC: 0.93 MG/DL
EGFR: 84 ML/MIN/1.73M2
GLUCOSE SERPL-MCNC: 101 MG/DL
POTASSIUM SERPL-SCNC: 4.3 MMOL/L
PSA FREE FLD-MCNC: 37 %
PSA FREE SERPL-MCNC: 1.25 NG/ML
PSA SERPL-MCNC: 3.34 NG/ML
SODIUM SERPL-SCNC: 141 MMOL/L

## 2023-10-31 PROCEDURE — 76775 US EXAM ABDO BACK WALL LIM: CPT

## 2023-10-31 PROCEDURE — 99214 OFFICE O/P EST MOD 30 MIN: CPT

## 2024-10-22 ENCOUNTER — APPOINTMENT (OUTPATIENT)
Dept: UROLOGY | Facility: CLINIC | Age: 79
End: 2024-10-22

## 2024-12-06 ENCOUNTER — APPOINTMENT (OUTPATIENT)
Dept: UROLOGY | Facility: CLINIC | Age: 79
End: 2024-12-06
Payer: MEDICARE

## 2024-12-06 VITALS
TEMPERATURE: 98.1 F | HEART RATE: 110 BPM | OXYGEN SATURATION: 94 % | DIASTOLIC BLOOD PRESSURE: 95 MMHG | SYSTOLIC BLOOD PRESSURE: 166 MMHG | BODY MASS INDEX: 24.22 KG/M2 | WEIGHT: 124 LBS | RESPIRATION RATE: 19 BRPM

## 2024-12-06 DIAGNOSIS — R97.20 ELEVATED PROSTATE, SPECIFIC ANTIGEN [PSA]: ICD-10-CM

## 2024-12-06 DIAGNOSIS — N20.1 CALCULUS OF URETER: ICD-10-CM

## 2024-12-06 PROCEDURE — 76775 US EXAM ABDO BACK WALL LIM: CPT

## 2024-12-06 PROCEDURE — 99214 OFFICE O/P EST MOD 30 MIN: CPT

## 2024-12-06 PROCEDURE — G2211 COMPLEX E/M VISIT ADD ON: CPT

## 2024-12-09 LAB
ANION GAP SERPL CALC-SCNC: 13 MMOL/L
BUN SERPL-MCNC: 24 MG/DL
CALCIUM SERPL-MCNC: 9.2 MG/DL
CHLORIDE SERPL-SCNC: 103 MMOL/L
CO2 SERPL-SCNC: 26 MMOL/L
CREAT SERPL-MCNC: 0.91 MG/DL
EGFR: 85 ML/MIN/1.73M2
GLUCOSE SERPL-MCNC: 121 MG/DL
POTASSIUM SERPL-SCNC: 3.7 MMOL/L
PSA FREE FLD-MCNC: 35 %
PSA FREE SERPL-MCNC: 1.02 NG/ML
PSA SERPL-MCNC: 2.9 NG/ML
SODIUM SERPL-SCNC: 142 MMOL/L